# Patient Record
Sex: FEMALE | Race: WHITE
[De-identification: names, ages, dates, MRNs, and addresses within clinical notes are randomized per-mention and may not be internally consistent; named-entity substitution may affect disease eponyms.]

---

## 2020-08-21 ENCOUNTER — HOSPITAL ENCOUNTER (OUTPATIENT)
Age: 55
End: 2020-08-21
Payer: MEDICAID

## 2020-08-21 DIAGNOSIS — Z79.84: ICD-10-CM

## 2020-08-21 DIAGNOSIS — E11.9: Primary | ICD-10-CM

## 2020-08-21 LAB
ALBUMIN LEVEL: 3.2 G/DL (ref 3.5–5)
ALBUMIN/GLOB SERPL: 0.9 {RATIO} (ref 1.1–1.8)
ALP ISO SERPL-ACNC: 102 U/L (ref 38–126)
ALT SERPLBLD-CCNC: 21 U/L (ref 12–78)
ANION GAP SERPL CALC-SCNC: 15 MEQ/L (ref 5–15)
AST SERPL QL: 38 U/L (ref 14–36)
BILIRUBIN,TOTAL: 1 MG/DL (ref 0.2–1.3)
BUN SERPL-MCNC: 6 MG/DL (ref 7–17)
CALCIUM SPEC-MCNC: 9.4 MG/DL (ref 8.4–10.2)
CHLORIDE SPEC-SCNC: 105 MMOL/L (ref 98–107)
CHOLEST SPEC-SCNC: 133 MG/DL (ref 140–200)
CO2 SERPL-SCNC: 26 MMOL/L (ref 22–30)
CREAT BLD-SCNC: 0.7 MG/DL (ref 0.52–1.04)
ESTIMATED GLOMERULAR FILT RATE: 87 ML/MIN (ref 60–?)
GFR (AFRICAN AMERICAN): 105 ML/MIN (ref 60–?)
GLOBULIN SER CALC-MCNC: 3.5 G/DL (ref 1.3–3.2)
GLUCOSE: 102 MG/DL (ref 74–100)
HBA1C MFR BLD: 5.1 % (ref 4–6)
HCT VFR BLD CALC: 37.6 % (ref 37–47)
HDLC SERPL-MCNC: 27 MG/DL (ref 40–60)
HGB BLD-MCNC: 12.3 G/DL (ref 12.2–16.2)
MCHC RBC-ENTMCNC: 32.8 G/DL (ref 31.8–35.4)
MCV RBC: 96.9 FL (ref 81–99)
MEAN CORPUSCULAR HEMOGLOBIN: 31.8 PG (ref 27–31.2)
PLATELET # BLD: 70 K/MM3 (ref 142–424)
POTASSIUM: 4 MMOL/L (ref 3.5–5.1)
PROT SERPL-MCNC: 6.7 G/DL (ref 6.3–8.2)
RBC # BLD AUTO: 3.88 M/MM3 (ref 4.2–5.4)
SODIUM SPEC-SCNC: 142 MMOL/L (ref 136–145)
T4 (THYROXINE): 10.4 UG/DL (ref 5.53–11)
TRIGLYCERIDES: 97 MG/DL (ref 30–150)
TSH SERPL-ACNC: 5.5 UIU/ML (ref 0.47–4.68)
WBC # BLD AUTO: 2.5 K/MM3 (ref 4.8–10.8)

## 2020-08-21 PROCEDURE — 80053 COMPREHEN METABOLIC PANEL: CPT

## 2020-08-21 PROCEDURE — 84436 ASSAY OF TOTAL THYROXINE: CPT

## 2020-08-21 PROCEDURE — 83036 HEMOGLOBIN GLYCOSYLATED A1C: CPT

## 2020-08-21 PROCEDURE — 82043 UR ALBUMIN QUANTITATIVE: CPT

## 2020-08-21 PROCEDURE — 85025 COMPLETE CBC W/AUTO DIFF WBC: CPT

## 2020-08-21 PROCEDURE — 82570 ASSAY OF URINE CREATININE: CPT

## 2020-08-21 PROCEDURE — 80061 LIPID PANEL: CPT

## 2020-08-21 PROCEDURE — 84443 ASSAY THYROID STIM HORMONE: CPT

## 2020-10-19 ENCOUNTER — HOSPITAL ENCOUNTER (OUTPATIENT)
Age: 55
End: 2020-10-19
Payer: MEDICAID

## 2020-10-19 DIAGNOSIS — L08.9: ICD-10-CM

## 2020-10-19 DIAGNOSIS — E11.9: Primary | ICD-10-CM

## 2020-10-19 LAB
ALBUMIN LEVEL: 3.3 G/DL (ref 3.5–5)
ALBUMIN/GLOB SERPL: 0.8 {RATIO} (ref 1.1–1.8)
ALP ISO SERPL-ACNC: 97 U/L (ref 38–126)
ALT SERPLBLD-CCNC: 20 U/L (ref 12–78)
ANION GAP SERPL CALC-SCNC: 16.7 MEQ/L (ref 5–15)
AST SERPL QL: 38 U/L (ref 14–36)
BILIRUBIN,TOTAL: 1.3 MG/DL (ref 0.2–1.3)
BUN SERPL-MCNC: 8 MG/DL (ref 7–17)
CALCIUM SPEC-MCNC: 9.2 MG/DL (ref 8.4–10.2)
CHLORIDE SPEC-SCNC: 104 MMOL/L (ref 98–107)
CO2 SERPL-SCNC: 24 MMOL/L (ref 22–30)
CREAT BLD-SCNC: 0.8 MG/DL (ref 0.52–1.04)
ESTIMATED GLOMERULAR FILT RATE: 74 ML/MIN (ref 60–?)
GFR (AFRICAN AMERICAN): 90 ML/MIN (ref 60–?)
GLOBULIN SER CALC-MCNC: 4.1 G/DL (ref 1.3–3.2)
GLUCOSE: 69 MG/DL (ref 74–100)
HBA1C MFR BLD: 4.8 % (ref 4–6)
HCT VFR BLD CALC: 39.1 % (ref 37–47)
HGB BLD-MCNC: 12.2 G/DL (ref 12.2–16.2)
MCHC RBC-ENTMCNC: 31.2 G/DL (ref 31.8–35.4)
MCV RBC: 98.6 FL (ref 81–99)
MEAN CORPUSCULAR HEMOGLOBIN: 30.8 PG (ref 27–31.2)
PLATELET # BLD: 87 K/MM3 (ref 142–424)
POTASSIUM: 3.7 MMOL/L (ref 3.5–5.1)
PROT SERPL-MCNC: 7.4 G/DL (ref 6.3–8.2)
RBC # BLD AUTO: 3.96 M/MM3 (ref 4.2–5.4)
SODIUM SPEC-SCNC: 141 MMOL/L (ref 136–145)
WBC # BLD AUTO: 2.9 K/MM3 (ref 4.8–10.8)

## 2020-10-19 PROCEDURE — 80053 COMPREHEN METABOLIC PANEL: CPT

## 2020-10-19 PROCEDURE — 83036 HEMOGLOBIN GLYCOSYLATED A1C: CPT

## 2020-10-19 PROCEDURE — 85025 COMPLETE CBC W/AUTO DIFF WBC: CPT

## 2020-11-19 ENCOUNTER — HOSPITAL ENCOUNTER (OUTPATIENT)
Age: 55
End: 2020-11-19
Payer: MEDICAID

## 2020-11-19 DIAGNOSIS — D47.2: Primary | ICD-10-CM

## 2020-11-19 DIAGNOSIS — D69.6: ICD-10-CM

## 2020-11-19 DIAGNOSIS — M54.5: ICD-10-CM

## 2020-11-19 LAB
ALBUMIN LEVEL: 3.7 G/DL (ref 3.5–5)
ALBUMIN/GLOB SERPL: 0.8 {RATIO} (ref 1.1–1.8)
ALP ISO SERPL-ACNC: 111 U/L (ref 38–126)
ALT SERPLBLD-CCNC: 26 U/L (ref 12–78)
ANION GAP SERPL CALC-SCNC: 17.7 MEQ/L (ref 5–15)
AST SERPL QL: 44 U/L (ref 14–36)
BILIRUBIN,TOTAL: 1.2 MG/DL (ref 0.2–1.3)
BUN SERPL-MCNC: 14 MG/DL (ref 7–17)
CALCIUM SPEC-MCNC: 9.5 MG/DL (ref 8.4–10.2)
CHLORIDE SPEC-SCNC: 106 MMOL/L (ref 98–107)
CO2 SERPL-SCNC: 20 MMOL/L (ref 22–30)
CREAT BLD-SCNC: 1.1 MG/DL (ref 0.52–1.04)
ESTIMATED GLOMERULAR FILT RATE: 52 ML/MIN (ref 60–?)
FERRITIN SERPL-MCNC: 96.4 NG/ML (ref 11.1–264)
GFR (AFRICAN AMERICAN): 62 ML/MIN (ref 60–?)
GLOBULIN SER CALC-MCNC: 4.8 G/DL (ref 1.3–3.2)
GLUCOSE: 78 MG/DL (ref 74–100)
HCT VFR BLD CALC: 41.4 % (ref 37–47)
HGB BLD-MCNC: 13 G/DL (ref 12.2–16.2)
IRON SERPL QL: 55 UG/DL (ref 37–170)
MCHC RBC-ENTMCNC: 31.3 G/DL (ref 31.8–35.4)
MCV RBC: 98.5 FL (ref 81–99)
MEAN CORPUSCULAR HEMOGLOBIN: 30.8 PG (ref 27–31.2)
PLATELET # BLD: 117 K/MM3 (ref 142–424)
POTASSIUM: 4.7 MMOL/L (ref 3.5–5.1)
PROT SERPL-MCNC: 8.5 G/DL (ref 6.3–8.2)
RBC # BLD AUTO: 4.2 M/MM3 (ref 4.2–5.4)
SODIUM SPEC-SCNC: 139 MMOL/L (ref 136–145)
TOTAL IRON BINDING CAPACITY: 289 UG/DL (ref 265–497)
WBC # BLD AUTO: 4.7 K/MM3 (ref 4.8–10.8)

## 2020-11-19 PROCEDURE — 83540 ASSAY OF IRON: CPT

## 2020-11-19 PROCEDURE — 83883 ASSAY NEPHELOMETRY NOT SPEC: CPT

## 2020-11-19 PROCEDURE — 82728 ASSAY OF FERRITIN: CPT

## 2020-11-19 PROCEDURE — 72170 X-RAY EXAM OF PELVIS: CPT

## 2020-11-19 PROCEDURE — 84165 PROTEIN E-PHORESIS SERUM: CPT

## 2020-11-19 PROCEDURE — 86334 IMMUNOFIX E-PHORESIS SERUM: CPT

## 2020-11-19 PROCEDURE — 72110 X-RAY EXAM L-2 SPINE 4/>VWS: CPT

## 2020-11-19 PROCEDURE — 83550 IRON BINDING TEST: CPT

## 2020-11-19 PROCEDURE — 84155 ASSAY OF PROTEIN SERUM: CPT

## 2020-11-19 PROCEDURE — 36415 COLL VENOUS BLD VENIPUNCTURE: CPT

## 2020-11-19 PROCEDURE — 82784 ASSAY IGA/IGD/IGG/IGM EACH: CPT

## 2020-11-19 PROCEDURE — 80053 COMPREHEN METABOLIC PANEL: CPT

## 2020-11-19 PROCEDURE — 85025 COMPLETE CBC W/AUTO DIFF WBC: CPT

## 2020-11-23 LAB
ALBUMIN SERPL ELPH-MCNC: 3 G/DL (ref 2.9–4.4)
ALPHA1 GLOB SERPL ELPH-MCNC: 0.3 G/DL (ref 0–0.4)
ALPHA2 GLOB SERPL ELPH-MCNC: 0.8 G/DL (ref 0.4–1)
GAMMA GLOBULIN: 2.5 G/DL (ref 0.4–1.8)
IGA SERPL-MCNC: 710 MG/DL (ref 87–352)
IGG SERPL-MCNC: 2429 MG/DL (ref 586–1602)
IGM SERPL-MCNC: 142 MG/DL (ref 26–217)

## 2020-12-15 ENCOUNTER — HOSPITAL ENCOUNTER (OUTPATIENT)
Dept: HOSPITAL 22 - RAD | Age: 55
Discharge: HOME | End: 2020-12-15
Payer: MEDICAID

## 2020-12-15 VITALS
OXYGEN SATURATION: 95 % | RESPIRATION RATE: 18 BRPM | DIASTOLIC BLOOD PRESSURE: 66 MMHG | SYSTOLIC BLOOD PRESSURE: 124 MMHG | HEART RATE: 90 BPM

## 2020-12-15 VITALS — HEART RATE: 100 BPM | DIASTOLIC BLOOD PRESSURE: 76 MMHG | RESPIRATION RATE: 18 BRPM | SYSTOLIC BLOOD PRESSURE: 132 MMHG

## 2020-12-15 VITALS — BODY MASS INDEX: 36.7 KG/M2

## 2020-12-15 VITALS — RESPIRATION RATE: 18 BRPM | SYSTOLIC BLOOD PRESSURE: 127 MMHG | HEART RATE: 93 BPM | DIASTOLIC BLOOD PRESSURE: 68 MMHG

## 2020-12-15 DIAGNOSIS — R18.8: Primary | ICD-10-CM

## 2020-12-15 DIAGNOSIS — K74.60: ICD-10-CM

## 2020-12-15 LAB
CREAT BLD-SCNC: 1.2 MG/DL (ref 0.52–1.04)
CREATININE CLEARANCE ESTIMATED: 81 ML/MIN (ref 50–200)
ESTIMATED GLOMERULAR FILT RATE: 47 ML/MIN (ref 60–?)
GFR (AFRICAN AMERICAN): 56 ML/MIN (ref 60–?)

## 2020-12-15 PROCEDURE — P9047 ALBUMIN (HUMAN), 25%, 50ML: HCPCS

## 2020-12-15 PROCEDURE — 96365 THER/PROPH/DIAG IV INF INIT: CPT

## 2020-12-15 PROCEDURE — 82565 ASSAY OF CREATININE: CPT

## 2020-12-15 PROCEDURE — 76705 ECHO EXAM OF ABDOMEN: CPT

## 2020-12-15 PROCEDURE — 96366 THER/PROPH/DIAG IV INF ADDON: CPT

## 2020-12-15 PROCEDURE — 49083 ABD PARACENTESIS W/IMAGING: CPT

## 2020-12-30 ENCOUNTER — HOSPITAL ENCOUNTER (OUTPATIENT)
Age: 55
End: 2020-12-30
Payer: MEDICAID

## 2020-12-30 DIAGNOSIS — R18.8: ICD-10-CM

## 2020-12-30 DIAGNOSIS — R16.0: Primary | ICD-10-CM

## 2020-12-30 PROCEDURE — 74176 CT ABD & PELVIS W/O CONTRAST: CPT

## 2021-01-06 ENCOUNTER — HOSPITAL ENCOUNTER (OUTPATIENT)
Dept: HOSPITAL 22 - RAD | Age: 56
Discharge: HOME | End: 2021-01-06
Payer: MEDICAID

## 2021-01-06 VITALS
DIASTOLIC BLOOD PRESSURE: 72 MMHG | SYSTOLIC BLOOD PRESSURE: 143 MMHG | OXYGEN SATURATION: 98 % | RESPIRATION RATE: 18 BRPM | HEART RATE: 97 BPM

## 2021-01-06 VITALS
RESPIRATION RATE: 18 BRPM | OXYGEN SATURATION: 96 % | SYSTOLIC BLOOD PRESSURE: 137 MMHG | HEART RATE: 96 BPM | DIASTOLIC BLOOD PRESSURE: 72 MMHG

## 2021-01-06 VITALS
OXYGEN SATURATION: 95 % | DIASTOLIC BLOOD PRESSURE: 62 MMHG | RESPIRATION RATE: 18 BRPM | SYSTOLIC BLOOD PRESSURE: 122 MMHG | HEART RATE: 98 BPM

## 2021-01-06 VITALS
DIASTOLIC BLOOD PRESSURE: 58 MMHG | SYSTOLIC BLOOD PRESSURE: 105 MMHG | OXYGEN SATURATION: 96 % | HEART RATE: 78 BPM | RESPIRATION RATE: 18 BRPM

## 2021-01-06 VITALS
RESPIRATION RATE: 18 BRPM | SYSTOLIC BLOOD PRESSURE: 117 MMHG | OXYGEN SATURATION: 94 % | HEART RATE: 97 BPM | DIASTOLIC BLOOD PRESSURE: 59 MMHG

## 2021-01-06 VITALS
RESPIRATION RATE: 18 BRPM | SYSTOLIC BLOOD PRESSURE: 130 MMHG | DIASTOLIC BLOOD PRESSURE: 78 MMHG | OXYGEN SATURATION: 99 % | HEART RATE: 89 BPM

## 2021-01-06 VITALS — BODY MASS INDEX: 35.2 KG/M2

## 2021-01-06 VITALS — RESPIRATION RATE: 18 BRPM

## 2021-01-06 DIAGNOSIS — R18.8: Primary | ICD-10-CM

## 2021-01-06 LAB
CREAT BLD-SCNC: 1.5 MG/DL (ref 0.52–1.04)
CREATININE CLEARANCE ESTIMATED: 64 ML/MIN (ref 50–200)
ESTIMATED GLOMERULAR FILT RATE: 36 ML/MIN (ref 60–?)
GFR (AFRICAN AMERICAN): 43 ML/MIN (ref 60–?)

## 2021-01-06 PROCEDURE — P9047 ALBUMIN (HUMAN), 25%, 50ML: HCPCS

## 2021-01-06 PROCEDURE — 82565 ASSAY OF CREATININE: CPT

## 2021-01-06 PROCEDURE — 96375 TX/PRO/DX INJ NEW DRUG ADDON: CPT

## 2021-01-06 PROCEDURE — 96366 THER/PROPH/DIAG IV INF ADDON: CPT

## 2021-01-06 PROCEDURE — 49083 ABD PARACENTESIS W/IMAGING: CPT

## 2021-01-06 PROCEDURE — 96365 THER/PROPH/DIAG IV INF INIT: CPT

## 2021-01-14 ENCOUNTER — HOSPITAL ENCOUNTER (OUTPATIENT)
Age: 56
End: 2021-01-14
Payer: MEDICAID

## 2021-01-14 DIAGNOSIS — K74.60: Primary | ICD-10-CM

## 2021-01-14 DIAGNOSIS — Z79.899: ICD-10-CM

## 2021-01-14 LAB
ALBUMIN LEVEL: 2.9 G/DL (ref 3.5–5)
ALBUMIN/GLOB SERPL: 0.7 {RATIO} (ref 1.1–1.8)
ALP ISO SERPL-ACNC: 112 U/L (ref 38–126)
ALT SERPLBLD-CCNC: 23 U/L (ref 12–78)
ANION GAP SERPL CALC-SCNC: 11.6 MEQ/L (ref 5–15)
AST SERPL QL: 43 U/L (ref 14–36)
BILIRUBIN,TOTAL: 1.4 MG/DL (ref 0.2–1.3)
BUN SERPL-MCNC: 28 MG/DL (ref 7–17)
CALCIUM SPEC-MCNC: 9.1 MG/DL (ref 8.4–10.2)
CHLORIDE SPEC-SCNC: 109 MMOL/L (ref 98–107)
CHOLEST SPEC-SCNC: 115 MG/DL (ref 140–200)
CO2 SERPL-SCNC: 21 MMOL/L (ref 22–30)
CREAT BLD-SCNC: 1.6 MG/DL (ref 0.52–1.04)
ESTIMATED GLOMERULAR FILT RATE: 33 ML/MIN (ref 60–?)
GFR (AFRICAN AMERICAN): 40 ML/MIN (ref 60–?)
GLOBULIN SER CALC-MCNC: 4.4 G/DL (ref 1.3–3.2)
GLUCOSE: 84 MG/DL (ref 74–100)
HCT VFR BLD CALC: 38.7 % (ref 37–47)
HDLC SERPL-MCNC: 24 MG/DL (ref 40–60)
HGB BLD-MCNC: 12.1 G/DL (ref 12.2–16.2)
LIPASE: 107 U/L (ref 23–300)
MCHC RBC-ENTMCNC: 31.2 G/DL (ref 31.8–35.4)
MCV RBC: 99 FL (ref 81–99)
MEAN CORPUSCULAR HEMOGLOBIN: 30.9 PG (ref 27–31.2)
PLATELET # BLD: 125 K/MM3 (ref 142–424)
POTASSIUM: 5.6 MMOL/L (ref 3.5–5.1)
PROT SERPL-MCNC: 7.3 G/DL (ref 6.3–8.2)
RBC # BLD AUTO: 3.91 M/MM3 (ref 4.2–5.4)
SODIUM SPEC-SCNC: 136 MMOL/L (ref 136–145)
TRIGLYCERIDES: 105 MG/DL (ref 30–150)
TSH SERPL-ACNC: 4.21 UIU/ML (ref 0.47–4.68)
WBC # BLD AUTO: 5 K/MM3 (ref 4.8–10.8)

## 2021-01-14 PROCEDURE — 80061 LIPID PANEL: CPT

## 2021-01-14 PROCEDURE — 84443 ASSAY THYROID STIM HORMONE: CPT

## 2021-01-14 PROCEDURE — 80053 COMPREHEN METABOLIC PANEL: CPT

## 2021-01-14 PROCEDURE — 83690 ASSAY OF LIPASE: CPT

## 2021-01-14 PROCEDURE — 85025 COMPLETE CBC W/AUTO DIFF WBC: CPT

## 2021-01-29 ENCOUNTER — HOSPITAL ENCOUNTER (OUTPATIENT)
Age: 56
End: 2021-01-29
Payer: MEDICAID

## 2021-01-29 DIAGNOSIS — R18.8: Primary | ICD-10-CM

## 2021-01-29 LAB
ALBUMIN LEVEL: 2.7 G/DL (ref 3.5–5)
ALBUMIN/GLOB SERPL: 0.6 {RATIO} (ref 1.1–1.8)
ALP ISO SERPL-ACNC: 145 U/L (ref 38–126)
ALT SERPLBLD-CCNC: 24 U/L (ref 12–78)
ANION GAP SERPL CALC-SCNC: 12.5 MEQ/L (ref 5–15)
AST SERPL QL: 40 U/L (ref 14–36)
BILIRUBIN,TOTAL: 1.1 MG/DL (ref 0.2–1.3)
BUN SERPL-MCNC: 24 MG/DL (ref 7–17)
CALCIUM SPEC-MCNC: 9 MG/DL (ref 8.4–10.2)
CHLORIDE SPEC-SCNC: 112 MMOL/L (ref 98–107)
CO2 SERPL-SCNC: 21 MMOL/L (ref 22–30)
CREAT BLD-SCNC: 1.7 MG/DL (ref 0.52–1.04)
ESTIMATED GLOMERULAR FILT RATE: 31 ML/MIN (ref 60–?)
GFR (AFRICAN AMERICAN): 38 ML/MIN (ref 60–?)
GLOBULIN SER CALC-MCNC: 4.4 G/DL (ref 1.3–3.2)
GLUCOSE: 104 MG/DL (ref 74–100)
HCT VFR BLD CALC: 33.9 % (ref 37–47)
HGB BLD-MCNC: 10.8 G/DL (ref 12.2–16.2)
MCHC RBC-ENTMCNC: 31.9 G/DL (ref 31.8–35.4)
MCV RBC: 98.8 FL (ref 81–99)
MEAN CORPUSCULAR HEMOGLOBIN: 31.5 PG (ref 27–31.2)
PLATELET # BLD: 120 K/MM3 (ref 142–424)
POTASSIUM: 5.5 MMOL/L (ref 3.5–5.1)
PROT SERPL-MCNC: 7.1 G/DL (ref 6.3–8.2)
RBC # BLD AUTO: 3.43 M/MM3 (ref 4.2–5.4)
SODIUM SPEC-SCNC: 140 MMOL/L (ref 136–145)
WBC # BLD AUTO: 4.5 K/MM3 (ref 4.8–10.8)

## 2021-01-29 PROCEDURE — 80053 COMPREHEN METABOLIC PANEL: CPT

## 2021-01-29 PROCEDURE — 85025 COMPLETE CBC W/AUTO DIFF WBC: CPT

## 2021-02-05 ENCOUNTER — HOSPITAL ENCOUNTER (OUTPATIENT)
Dept: HOSPITAL 22 - ER | Age: 56
Setting detail: OBSERVATION
LOS: 1 days | Discharge: HOME | End: 2021-02-06
Payer: MEDICAID

## 2021-02-05 VITALS
SYSTOLIC BLOOD PRESSURE: 123 MMHG | DIASTOLIC BLOOD PRESSURE: 77 MMHG | HEART RATE: 109 BPM | RESPIRATION RATE: 18 BRPM | OXYGEN SATURATION: 98 % | TEMPERATURE: 98.4 F

## 2021-02-05 VITALS
SYSTOLIC BLOOD PRESSURE: 123 MMHG | HEART RATE: 102 BPM | DIASTOLIC BLOOD PRESSURE: 65 MMHG | OXYGEN SATURATION: 98 % | RESPIRATION RATE: 18 BRPM

## 2021-02-05 VITALS — BODY MASS INDEX: 34.3 KG/M2

## 2021-02-05 VITALS
OXYGEN SATURATION: 99 % | DIASTOLIC BLOOD PRESSURE: 60 MMHG | HEART RATE: 78 BPM | SYSTOLIC BLOOD PRESSURE: 101 MMHG | TEMPERATURE: 98.5 F | RESPIRATION RATE: 17 BRPM

## 2021-02-05 VITALS
HEART RATE: 96 BPM | DIASTOLIC BLOOD PRESSURE: 56 MMHG | OXYGEN SATURATION: 98 % | RESPIRATION RATE: 18 BRPM | SYSTOLIC BLOOD PRESSURE: 97 MMHG

## 2021-02-05 VITALS
SYSTOLIC BLOOD PRESSURE: 95 MMHG | DIASTOLIC BLOOD PRESSURE: 54 MMHG | OXYGEN SATURATION: 98 % | RESPIRATION RATE: 20 BRPM | HEART RATE: 95 BPM

## 2021-02-05 VITALS
RESPIRATION RATE: 18 BRPM | HEART RATE: 98 BPM | OXYGEN SATURATION: 95 % | SYSTOLIC BLOOD PRESSURE: 104 MMHG | DIASTOLIC BLOOD PRESSURE: 55 MMHG

## 2021-02-05 VITALS
TEMPERATURE: 98.42 F | HEART RATE: 95 BPM | OXYGEN SATURATION: 96 % | RESPIRATION RATE: 20 BRPM | DIASTOLIC BLOOD PRESSURE: 56 MMHG | SYSTOLIC BLOOD PRESSURE: 97 MMHG

## 2021-02-05 DIAGNOSIS — Z88.0: ICD-10-CM

## 2021-02-05 DIAGNOSIS — Z88.8: ICD-10-CM

## 2021-02-05 DIAGNOSIS — E11.9: ICD-10-CM

## 2021-02-05 DIAGNOSIS — K74.60: Primary | ICD-10-CM

## 2021-02-05 DIAGNOSIS — Z79.4: ICD-10-CM

## 2021-02-05 DIAGNOSIS — Z88.5: ICD-10-CM

## 2021-02-05 DIAGNOSIS — D47.2: ICD-10-CM

## 2021-02-05 DIAGNOSIS — R18.8: ICD-10-CM

## 2021-02-05 DIAGNOSIS — R29.6: ICD-10-CM

## 2021-02-05 DIAGNOSIS — I10: ICD-10-CM

## 2021-02-05 DIAGNOSIS — Z79.51: ICD-10-CM

## 2021-02-05 DIAGNOSIS — Z72.0: ICD-10-CM

## 2021-02-05 DIAGNOSIS — Z79.899: ICD-10-CM

## 2021-02-05 DIAGNOSIS — Z88.2: ICD-10-CM

## 2021-02-05 LAB
ALBUMIN LEVEL: 3 G/DL (ref 3.5–5)
ALBUMIN/GLOB SERPL: 0.6 {RATIO} (ref 1.1–1.8)
ALP ISO SERPL-ACNC: 178 U/L (ref 38–126)
ALT SERPLBLD-CCNC: 28 U/L (ref 12–78)
AMMONIA: 75 UMOL/L (ref 9–30)
ANION GAP SERPL CALC-SCNC: 12.5 MEQ/L (ref 5–15)
APTT PPP: 26.6 SECONDS (ref 23.6–34)
AST SERPL QL: 44 U/L (ref 14–36)
BILIRUBIN,TOTAL: 0.7 MG/DL (ref 0.2–1.3)
BUN SERPL-MCNC: 27 MG/DL (ref 7–17)
CALCIUM SPEC-MCNC: 9.3 MG/DL (ref 8.4–10.2)
CHLORIDE SPEC-SCNC: 115 MMOL/L (ref 98–107)
CO2 SERPL-SCNC: 18 MMOL/L (ref 22–30)
CREAT BLD-SCNC: 1.9 MG/DL (ref 0.52–1.04)
CREATININE CLEARANCE ESTIMATED: 47 ML/MIN (ref 50–200)
ESTIMATED GLOMERULAR FILT RATE: 27 ML/MIN (ref 60–?)
GFR (AFRICAN AMERICAN): 33 ML/MIN (ref 60–?)
GLOBULIN SER CALC-MCNC: 5 G/DL (ref 1.3–3.2)
GLUCOSE: 100 MG/DL (ref 74–100)
HCT VFR BLD CALC: 31.6 % (ref 37–47)
HGB BLD-MCNC: 9.8 G/DL (ref 12.2–16.2)
INR PPP: 1.26 (ref 0.9–1.1)
LACTATE SERPL-MCNC: 3.2 MMOL/L (ref 0.7–2.1)
LACTIC ACID FOLLOW UP (RFLX 2): 4.4 MMOL/L (ref 0.7–2.1)
LIPASE: 114 U/L (ref 23–300)
MCHC RBC-ENTMCNC: 31 G/DL (ref 31.8–35.4)
MCV RBC: 99.4 FL (ref 81–99)
MEAN CORPUSCULAR HEMOGLOBIN: 30.8 PG (ref 27–31.2)
PLATELET # BLD: 126 K/MM3 (ref 142–424)
POTASSIUM: 5.5 MMOL/L (ref 3.5–5.1)
PROT SERPL-MCNC: 8 G/DL (ref 6.3–8.2)
PT BLD: 12.8 SECONDS (ref 9.4–11.8)
RBC # BLD AUTO: 3.18 M/MM3 (ref 4.2–5.4)
REFLEX LACTIC (2 HRS): (no result)
REFLEX LACTIC: (no result)
SODIUM SPEC-SCNC: 140 MMOL/L (ref 136–145)
WBC # BLD AUTO: 4.5 K/MM3 (ref 4.8–10.8)

## 2021-02-05 PROCEDURE — 85610 PROTHROMBIN TIME: CPT

## 2021-02-05 PROCEDURE — 96365 THER/PROPH/DIAG IV INF INIT: CPT

## 2021-02-05 PROCEDURE — 83605 ASSAY OF LACTIC ACID: CPT

## 2021-02-05 PROCEDURE — U0003 INFECTIOUS AGENT DETECTION BY NUCLEIC ACID (DNA OR RNA); SEVERE ACUTE RESPIRATORY SYNDROME CORONAVIRUS 2 (SARS-COV-2) (CORONAVIRUS DISEASE [COVID-19]), AMPLIFIED PROBE TECHNIQUE, MAKING USE OF HIGH THROUGHPUT TECHNOLOGIES AS DESCRIBED BY CMS-2020-01-R: HCPCS

## 2021-02-05 PROCEDURE — 96366 THER/PROPH/DIAG IV INF ADDON: CPT

## 2021-02-05 PROCEDURE — 49083 ABD PARACENTESIS W/IMAGING: CPT

## 2021-02-05 PROCEDURE — 99285 EMERGENCY DEPT VISIT HI MDM: CPT

## 2021-02-05 PROCEDURE — G0378 HOSPITAL OBSERVATION PER HR: HCPCS

## 2021-02-05 PROCEDURE — 36415 COLL VENOUS BLD VENIPUNCTURE: CPT

## 2021-02-05 PROCEDURE — 82140 ASSAY OF AMMONIA: CPT

## 2021-02-05 PROCEDURE — 85730 THROMBOPLASTIN TIME PARTIAL: CPT

## 2021-02-05 PROCEDURE — P9047 ALBUMIN (HUMAN), 25%, 50ML: HCPCS

## 2021-02-05 PROCEDURE — 96375 TX/PRO/DX INJ NEW DRUG ADDON: CPT

## 2021-02-05 PROCEDURE — 85025 COMPLETE CBC W/AUTO DIFF WBC: CPT

## 2021-02-05 PROCEDURE — 80053 COMPREHEN METABOLIC PANEL: CPT

## 2021-02-05 PROCEDURE — 83690 ASSAY OF LIPASE: CPT

## 2021-02-05 PROCEDURE — 87040 BLOOD CULTURE FOR BACTERIA: CPT

## 2021-02-11 ENCOUNTER — HOSPITAL ENCOUNTER (OUTPATIENT)
Age: 56
End: 2021-02-11
Payer: MEDICAID

## 2021-02-11 DIAGNOSIS — E11.9: ICD-10-CM

## 2021-02-11 DIAGNOSIS — K74.60: ICD-10-CM

## 2021-02-11 DIAGNOSIS — Z79.84: ICD-10-CM

## 2021-02-11 DIAGNOSIS — D69.6: Primary | ICD-10-CM

## 2021-02-11 LAB
ALBUMIN LEVEL: 3.1 G/DL (ref 3.5–5)
ALBUMIN/GLOB SERPL: 0.7 {RATIO} (ref 1.1–1.8)
ALP ISO SERPL-ACNC: 129 U/L (ref 38–126)
ALT SERPLBLD-CCNC: 35 U/L (ref 12–78)
ANION GAP SERPL CALC-SCNC: 15.8 MEQ/L (ref 5–15)
AST SERPL QL: 47 U/L (ref 14–36)
BILIRUBIN,TOTAL: 1.1 MG/DL (ref 0.2–1.3)
BUN SERPL-MCNC: 36 MG/DL (ref 7–17)
CALCIUM SPEC-MCNC: 9.2 MG/DL (ref 8.4–10.2)
CHLORIDE SPEC-SCNC: 116 MMOL/L (ref 98–107)
CO2 SERPL-SCNC: 14 MMOL/L (ref 22–30)
CREAT BLD-SCNC: 2.6 MG/DL (ref 0.52–1.04)
ESTIMATED GLOMERULAR FILT RATE: 19 ML/MIN (ref 60–?)
GFR (AFRICAN AMERICAN): 23 ML/MIN (ref 60–?)
GLOBULIN SER CALC-MCNC: 4.5 G/DL (ref 1.3–3.2)
GLUCOSE: 89 MG/DL (ref 74–100)
HBA1C MFR BLD: 4.9 % (ref 4–6)
POTASSIUM: 5.8 MMOL/L (ref 3.5–5.1)
PROT SERPL-MCNC: 7.6 G/DL (ref 6.3–8.2)
SODIUM SPEC-SCNC: 140 MMOL/L (ref 136–145)

## 2021-02-11 PROCEDURE — 83036 HEMOGLOBIN GLYCOSYLATED A1C: CPT

## 2021-02-11 PROCEDURE — 82043 UR ALBUMIN QUANTITATIVE: CPT

## 2021-02-11 PROCEDURE — 80053 COMPREHEN METABOLIC PANEL: CPT

## 2021-03-03 ENCOUNTER — HOSPITAL ENCOUNTER (OUTPATIENT)
Age: 56
End: 2021-03-03
Payer: MEDICAID

## 2021-03-03 VITALS — SYSTOLIC BLOOD PRESSURE: 113 MMHG | RESPIRATION RATE: 18 BRPM | HEART RATE: 105 BPM | DIASTOLIC BLOOD PRESSURE: 61 MMHG

## 2021-03-03 VITALS — DIASTOLIC BLOOD PRESSURE: 78 MMHG | SYSTOLIC BLOOD PRESSURE: 136 MMHG | RESPIRATION RATE: 18 BRPM | HEART RATE: 111 BPM

## 2021-03-03 VITALS
SYSTOLIC BLOOD PRESSURE: 112 MMHG | RESPIRATION RATE: 16 BRPM | TEMPERATURE: 98.96 F | OXYGEN SATURATION: 93 % | DIASTOLIC BLOOD PRESSURE: 53 MMHG

## 2021-03-03 VITALS
SYSTOLIC BLOOD PRESSURE: 121 MMHG | OXYGEN SATURATION: 98 % | DIASTOLIC BLOOD PRESSURE: 68 MMHG | TEMPERATURE: 97.4 F | HEART RATE: 109 BPM | RESPIRATION RATE: 20 BRPM

## 2021-03-03 VITALS — SYSTOLIC BLOOD PRESSURE: 108 MMHG | DIASTOLIC BLOOD PRESSURE: 65 MMHG | RESPIRATION RATE: 20 BRPM | HEART RATE: 112 BPM

## 2021-03-03 VITALS — BODY MASS INDEX: 36.7 KG/M2

## 2021-03-03 DIAGNOSIS — K74.60: Primary | ICD-10-CM

## 2021-03-03 DIAGNOSIS — R18.8: ICD-10-CM

## 2021-03-03 LAB
CREAT BLD-SCNC: 1.7 MG/DL (ref 0.52–1.04)
CREATININE CLEARANCE ESTIMATED: 57 ML/MIN (ref 50–200)
ESTIMATED GLOMERULAR FILT RATE: 31 ML/MIN (ref 60–?)
GFR (AFRICAN AMERICAN): 38 ML/MIN (ref 60–?)

## 2021-03-03 PROCEDURE — 82565 ASSAY OF CREATININE: CPT

## 2021-03-03 PROCEDURE — 49083 ABD PARACENTESIS W/IMAGING: CPT

## 2021-03-03 PROCEDURE — 96366 THER/PROPH/DIAG IV INF ADDON: CPT

## 2021-03-03 PROCEDURE — 96365 THER/PROPH/DIAG IV INF INIT: CPT

## 2021-03-17 ENCOUNTER — HOSPITAL ENCOUNTER (OUTPATIENT)
Dept: HOSPITAL 22 - RAD | Age: 56
Discharge: HOME | End: 2021-03-17
Payer: MEDICAID

## 2021-03-17 VITALS
RESPIRATION RATE: 18 BRPM | SYSTOLIC BLOOD PRESSURE: 115 MMHG | HEART RATE: 88 BPM | DIASTOLIC BLOOD PRESSURE: 64 MMHG | OXYGEN SATURATION: 98 %

## 2021-03-17 VITALS — DIASTOLIC BLOOD PRESSURE: 57 MMHG | SYSTOLIC BLOOD PRESSURE: 107 MMHG | RESPIRATION RATE: 18 BRPM | HEART RATE: 105 BPM

## 2021-03-17 VITALS — BODY MASS INDEX: 36.7 KG/M2

## 2021-03-17 DIAGNOSIS — R14.0: ICD-10-CM

## 2021-03-17 DIAGNOSIS — R18.8: Primary | ICD-10-CM

## 2021-03-17 DIAGNOSIS — K74.60: ICD-10-CM

## 2021-03-17 PROCEDURE — 96365 THER/PROPH/DIAG IV INF INIT: CPT

## 2021-03-17 PROCEDURE — P9047 ALBUMIN (HUMAN), 25%, 50ML: HCPCS

## 2021-03-17 PROCEDURE — 96366 THER/PROPH/DIAG IV INF ADDON: CPT

## 2021-03-17 PROCEDURE — 49083 ABD PARACENTESIS W/IMAGING: CPT

## 2021-03-17 PROCEDURE — 82565 ASSAY OF CREATININE: CPT

## 2021-03-26 ENCOUNTER — HOSPITAL ENCOUNTER (OUTPATIENT)
Dept: HOSPITAL 22 - RAD | Age: 56
Discharge: HOME | End: 2021-03-26
Payer: MEDICAID

## 2021-03-26 VITALS
DIASTOLIC BLOOD PRESSURE: 44 MMHG | OXYGEN SATURATION: 100 % | SYSTOLIC BLOOD PRESSURE: 91 MMHG | HEART RATE: 80 BPM | RESPIRATION RATE: 18 BRPM

## 2021-03-26 VITALS — HEART RATE: 75 BPM | DIASTOLIC BLOOD PRESSURE: 64 MMHG | SYSTOLIC BLOOD PRESSURE: 94 MMHG | RESPIRATION RATE: 18 BRPM

## 2021-03-26 VITALS — BODY MASS INDEX: 27.4 KG/M2

## 2021-03-26 DIAGNOSIS — R18.8: Primary | ICD-10-CM

## 2021-03-26 LAB
ALBUMIN LEVEL: 3.2 G/DL (ref 3.5–5)
ALBUMIN/GLOB SERPL: 0.7 {RATIO} (ref 1.1–1.8)
ALP ISO SERPL-ACNC: 184 U/L (ref 38–126)
ALT SERPLBLD-CCNC: 35 U/L (ref 12–78)
ANION GAP SERPL CALC-SCNC: 15.1 MEQ/L (ref 5–15)
AST SERPL QL: 50 U/L (ref 14–36)
BILIRUBIN,TOTAL: 2.4 MG/DL (ref 0.2–1.3)
BUN SERPL-MCNC: 24 MG/DL (ref 7–17)
CALCIUM SPEC-MCNC: 8.8 MG/DL (ref 8.4–10.2)
CHLORIDE SPEC-SCNC: 108 MMOL/L (ref 98–107)
CO2 SERPL-SCNC: 16 MMOL/L (ref 22–30)
CREAT BLD-SCNC: 1.8 MG/DL (ref 0.52–1.04)
CREATININE CLEARANCE ESTIMATED: 40 ML/MIN (ref 50–200)
ESTIMATED GLOMERULAR FILT RATE: 29 ML/MIN (ref 60–?)
GFR (AFRICAN AMERICAN): 35 ML/MIN (ref 60–?)
GLOBULIN SER CALC-MCNC: 4.5 G/DL (ref 1.3–3.2)
GLUCOSE: 99 MG/DL (ref 74–100)
HCT VFR BLD CALC: 31.8 % (ref 37–47)
HGB BLD-MCNC: 10.1 G/DL (ref 12.2–16.2)
MCHC RBC-ENTMCNC: 31.9 G/DL (ref 31.8–35.4)
MCV RBC: 96.4 FL (ref 81–99)
MEAN CORPUSCULAR HEMOGLOBIN: 30.8 PG (ref 27–31.2)
PLATELET # BLD: 106 K/MM3 (ref 142–424)
POTASSIUM: 4.1 MMOL/L (ref 3.5–5.1)
PROT SERPL-MCNC: 7.7 G/DL (ref 6.3–8.2)
RBC # BLD AUTO: 3.3 M/MM3 (ref 4.2–5.4)
SODIUM SPEC-SCNC: 135 MMOL/L (ref 136–145)
WBC # BLD AUTO: 6.5 K/MM3 (ref 4.8–10.8)

## 2021-03-26 PROCEDURE — 84155 ASSAY OF PROTEIN SERUM: CPT

## 2021-03-26 PROCEDURE — 85025 COMPLETE CBC W/AUTO DIFF WBC: CPT

## 2021-03-26 PROCEDURE — 82784 ASSAY IGA/IGD/IGG/IGM EACH: CPT

## 2021-03-26 PROCEDURE — 83883 ASSAY NEPHELOMETRY NOT SPEC: CPT

## 2021-03-26 PROCEDURE — 49083 ABD PARACENTESIS W/IMAGING: CPT

## 2021-03-26 PROCEDURE — 96365 THER/PROPH/DIAG IV INF INIT: CPT

## 2021-03-26 PROCEDURE — P9047 ALBUMIN (HUMAN), 25%, 50ML: HCPCS

## 2021-03-26 PROCEDURE — 86334 IMMUNOFIX E-PHORESIS SERUM: CPT

## 2021-03-26 PROCEDURE — 84165 PROTEIN E-PHORESIS SERUM: CPT

## 2021-03-26 PROCEDURE — 96366 THER/PROPH/DIAG IV INF ADDON: CPT

## 2021-03-26 PROCEDURE — 80053 COMPREHEN METABOLIC PANEL: CPT

## 2021-03-27 ENCOUNTER — HOSPITAL ENCOUNTER (OUTPATIENT)
Age: 56
End: 2021-03-27
Payer: MEDICAID

## 2021-03-27 DIAGNOSIS — R10.9: Primary | ICD-10-CM

## 2021-03-27 DIAGNOSIS — R18.8: ICD-10-CM

## 2021-03-27 LAB
ANION GAP SERPL CALC-SCNC: 15.2 MEQ/L (ref 5–15)
BUN SERPL-MCNC: 22 MG/DL (ref 7–17)
CALCIUM SPEC-MCNC: 8.4 MG/DL (ref 8.4–10.2)
CHLORIDE SPEC-SCNC: 110 MMOL/L (ref 98–107)
CO2 SERPL-SCNC: 16 MMOL/L (ref 22–30)
CREAT BLD-SCNC: 1.6 MG/DL (ref 0.52–1.04)
ESTIMATED GLOMERULAR FILT RATE: 33 ML/MIN (ref 60–?)
GFR (AFRICAN AMERICAN): 40 ML/MIN (ref 60–?)
GLUCOSE: 95 MG/DL (ref 74–100)
HCT VFR BLD CALC: 26.4 % (ref 37–47)
HGB BLD-MCNC: 8.5 G/DL (ref 12.2–16.2)
INR PPP: 1.52 (ref 0.9–1.1)
MCHC RBC-ENTMCNC: 32.1 G/DL (ref 31.8–35.4)
MCV RBC: 96.9 FL (ref 81–99)
MEAN CORPUSCULAR HEMOGLOBIN: 31.1 PG (ref 27–31.2)
PLATELET # BLD: 83 K/MM3 (ref 142–424)
POTASSIUM: 4.2 MMOL/L (ref 3.5–5.1)
PT BLD: 17.4 SECONDS (ref 10.1–12.5)
RBC # BLD AUTO: 2.73 M/MM3 (ref 4.2–5.4)
SODIUM SPEC-SCNC: 137 MMOL/L (ref 136–145)
WBC # BLD AUTO: 5.9 K/MM3 (ref 4.8–10.8)

## 2021-03-27 PROCEDURE — 80048 BASIC METABOLIC PNL TOTAL CA: CPT

## 2021-03-27 PROCEDURE — 86328 IA NFCT AB SARSCOV2 COVID19: CPT

## 2021-03-27 PROCEDURE — 85025 COMPLETE CBC W/AUTO DIFF WBC: CPT

## 2021-03-27 PROCEDURE — 36415 COLL VENOUS BLD VENIPUNCTURE: CPT

## 2021-03-27 PROCEDURE — 85610 PROTHROMBIN TIME: CPT

## 2021-03-29 LAB
ALBUMIN SERPL ELPH-MCNC: 3.2 G/DL (ref 2.9–4.4)
ALPHA1 GLOB SERPL ELPH-MCNC: 0.2 G/DL (ref 0–0.4)
ALPHA2 GLOB SERPL ELPH-MCNC: 0.5 G/DL (ref 0.4–1)
GAMMA GLOBULIN: 2.8 G/DL (ref 0.4–1.8)
IGG SERPL-MCNC: 2757 MG/DL (ref 586–1602)

## 2021-03-30 ENCOUNTER — HOSPITAL ENCOUNTER (OUTPATIENT)
Dept: HOSPITAL 22 - OR | Age: 56
Discharge: HOME | End: 2021-03-30
Payer: MEDICAID

## 2021-03-30 VITALS
HEART RATE: 96 BPM | OXYGEN SATURATION: 97 % | SYSTOLIC BLOOD PRESSURE: 112 MMHG | DIASTOLIC BLOOD PRESSURE: 74 MMHG | TEMPERATURE: 97.16 F | RESPIRATION RATE: 12 BRPM

## 2021-03-30 VITALS — BODY MASS INDEX: 26.2 KG/M2

## 2021-03-30 VITALS
DIASTOLIC BLOOD PRESSURE: 65 MMHG | OXYGEN SATURATION: 97 % | SYSTOLIC BLOOD PRESSURE: 109 MMHG | TEMPERATURE: 97.16 F | RESPIRATION RATE: 16 BRPM | HEART RATE: 94 BPM

## 2021-03-30 VITALS
RESPIRATION RATE: 16 BRPM | SYSTOLIC BLOOD PRESSURE: 90 MMHG | OXYGEN SATURATION: 97 % | HEART RATE: 91 BPM | TEMPERATURE: 97.1 F | DIASTOLIC BLOOD PRESSURE: 63 MMHG

## 2021-03-30 VITALS
RESPIRATION RATE: 18 BRPM | TEMPERATURE: 98.96 F | DIASTOLIC BLOOD PRESSURE: 66 MMHG | SYSTOLIC BLOOD PRESSURE: 114 MMHG | OXYGEN SATURATION: 100 % | HEART RATE: 104 BPM

## 2021-03-30 VITALS
TEMPERATURE: 97.52 F | DIASTOLIC BLOOD PRESSURE: 56 MMHG | OXYGEN SATURATION: 98 % | HEART RATE: 91 BPM | RESPIRATION RATE: 18 BRPM | SYSTOLIC BLOOD PRESSURE: 95 MMHG

## 2021-03-30 VITALS
RESPIRATION RATE: 18 BRPM | TEMPERATURE: 97.52 F | SYSTOLIC BLOOD PRESSURE: 112 MMHG | HEART RATE: 92 BPM | DIASTOLIC BLOOD PRESSURE: 50 MMHG | OXYGEN SATURATION: 100 %

## 2021-03-30 VITALS
RESPIRATION RATE: 18 BRPM | TEMPERATURE: 97.52 F | DIASTOLIC BLOOD PRESSURE: 54 MMHG | HEART RATE: 96 BPM | OXYGEN SATURATION: 99 % | SYSTOLIC BLOOD PRESSURE: 103 MMHG

## 2021-03-30 VITALS
RESPIRATION RATE: 18 BRPM | TEMPERATURE: 97.52 F | OXYGEN SATURATION: 97 % | HEART RATE: 91 BPM | DIASTOLIC BLOOD PRESSURE: 66 MMHG | SYSTOLIC BLOOD PRESSURE: 110 MMHG

## 2021-03-30 VITALS
OXYGEN SATURATION: 97 % | DIASTOLIC BLOOD PRESSURE: 67 MMHG | RESPIRATION RATE: 16 BRPM | SYSTOLIC BLOOD PRESSURE: 104 MMHG | HEART RATE: 94 BPM | TEMPERATURE: 97.1 F

## 2021-03-30 VITALS
RESPIRATION RATE: 16 BRPM | DIASTOLIC BLOOD PRESSURE: 57 MMHG | SYSTOLIC BLOOD PRESSURE: 99 MMHG | TEMPERATURE: 97.5 F | HEART RATE: 90 BPM | OXYGEN SATURATION: 97 %

## 2021-03-30 DIAGNOSIS — Z85.038: ICD-10-CM

## 2021-03-30 DIAGNOSIS — I50.9: ICD-10-CM

## 2021-03-30 DIAGNOSIS — F41.9: ICD-10-CM

## 2021-03-30 DIAGNOSIS — K74.60: Primary | ICD-10-CM

## 2021-03-30 DIAGNOSIS — Z85.828: ICD-10-CM

## 2021-03-30 DIAGNOSIS — E11.9: ICD-10-CM

## 2021-03-30 DIAGNOSIS — I11.0: ICD-10-CM

## 2021-03-30 DIAGNOSIS — Z85.3: ICD-10-CM

## 2021-03-30 DIAGNOSIS — F32.9: ICD-10-CM

## 2021-03-30 DIAGNOSIS — K21.9: ICD-10-CM

## 2021-03-30 DIAGNOSIS — E07.9: ICD-10-CM

## 2021-03-30 DIAGNOSIS — E78.5: ICD-10-CM

## 2021-03-30 LAB
GLUCOSE BLDC GLUCOMTR-MCNC: 67 MG/DL (ref 70–110)
GLUCOSE BLDC GLUCOMTR-MCNC: 76 MG/DL (ref 70–110)
IGA SERPL-MCNC: 1051 MG/DL (ref 87–352)
IGM SERPL-MCNC: 165 MG/DL (ref 26–217)

## 2021-03-30 PROCEDURE — 71045 X-RAY EXAM CHEST 1 VIEW: CPT

## 2021-03-30 PROCEDURE — 82962 GLUCOSE BLOOD TEST: CPT

## 2021-03-30 PROCEDURE — 96374 THER/PROPH/DIAG INJ IV PUSH: CPT

## 2021-03-30 PROCEDURE — 76000 FLUOROSCOPY <1 HR PHYS/QHP: CPT

## 2021-03-30 PROCEDURE — C1788 PORT, INDWELLING, IMP: HCPCS

## 2021-03-30 PROCEDURE — 36561 INSERT TUNNELED CV CATH: CPT

## 2021-03-31 VITALS — DIASTOLIC BLOOD PRESSURE: 66 MMHG | TEMPERATURE: 97.6 F | HEART RATE: 91 BPM | SYSTOLIC BLOOD PRESSURE: 110 MMHG

## 2021-04-07 ENCOUNTER — HOSPITAL ENCOUNTER (OUTPATIENT)
Age: 56
Discharge: HOME | End: 2021-04-07
Payer: MEDICAID

## 2021-04-07 VITALS — HEART RATE: 99 BPM | DIASTOLIC BLOOD PRESSURE: 47 MMHG | RESPIRATION RATE: 20 BRPM | SYSTOLIC BLOOD PRESSURE: 92 MMHG

## 2021-04-07 VITALS
OXYGEN SATURATION: 97 % | TEMPERATURE: 97.16 F | DIASTOLIC BLOOD PRESSURE: 68 MMHG | RESPIRATION RATE: 18 BRPM | HEART RATE: 99 BPM | SYSTOLIC BLOOD PRESSURE: 105 MMHG

## 2021-04-07 VITALS — BODY MASS INDEX: 27.6 KG/M2

## 2021-04-07 DIAGNOSIS — R18.8: Primary | ICD-10-CM

## 2021-04-07 LAB
CREAT BLD-SCNC: 2 MG/DL (ref 0.52–1.04)
CREATININE CLEARANCE ESTIMATED: 37 ML/MIN (ref 50–200)
ESTIMATED GLOMERULAR FILT RATE: 26 ML/MIN (ref 60–?)
GFR (AFRICAN AMERICAN): 31 ML/MIN (ref 60–?)

## 2021-04-07 PROCEDURE — P9047 ALBUMIN (HUMAN), 25%, 50ML: HCPCS

## 2021-04-07 PROCEDURE — 96365 THER/PROPH/DIAG IV INF INIT: CPT

## 2021-04-07 PROCEDURE — 82565 ASSAY OF CREATININE: CPT

## 2021-04-07 PROCEDURE — 49083 ABD PARACENTESIS W/IMAGING: CPT

## 2021-04-07 PROCEDURE — 96366 THER/PROPH/DIAG IV INF ADDON: CPT

## 2021-04-21 ENCOUNTER — HOSPITAL ENCOUNTER (OUTPATIENT)
Dept: HOSPITAL 22 - RAD | Age: 56
Discharge: STILL ADMITTED OR ADMINISTRATIVE | End: 2021-04-21
Payer: MEDICAID

## 2021-04-21 ENCOUNTER — HOSPITAL ENCOUNTER (INPATIENT)
Dept: HOSPITAL 22 - ER | Age: 56
LOS: 3 days | Discharge: HOME | DRG: 682 | End: 2021-04-24
Payer: MEDICAID

## 2021-04-21 VITALS
HEART RATE: 92 BPM | TEMPERATURE: 97.88 F | RESPIRATION RATE: 16 BRPM | OXYGEN SATURATION: 100 % | DIASTOLIC BLOOD PRESSURE: 54 MMHG | SYSTOLIC BLOOD PRESSURE: 98 MMHG

## 2021-04-21 VITALS — SYSTOLIC BLOOD PRESSURE: 96 MMHG | DIASTOLIC BLOOD PRESSURE: 49 MMHG | HEART RATE: 79 BPM | OXYGEN SATURATION: 100 %

## 2021-04-21 VITALS — DIASTOLIC BLOOD PRESSURE: 69 MMHG | SYSTOLIC BLOOD PRESSURE: 123 MMHG | HEART RATE: 85 BPM | OXYGEN SATURATION: 100 %

## 2021-04-21 VITALS
TEMPERATURE: 99.3 F | DIASTOLIC BLOOD PRESSURE: 58 MMHG | SYSTOLIC BLOOD PRESSURE: 90 MMHG | HEART RATE: 87 BPM | RESPIRATION RATE: 18 BRPM | OXYGEN SATURATION: 99 %

## 2021-04-21 VITALS
DIASTOLIC BLOOD PRESSURE: 53 MMHG | TEMPERATURE: 97.88 F | HEART RATE: 78 BPM | RESPIRATION RATE: 16 BRPM | SYSTOLIC BLOOD PRESSURE: 97 MMHG | OXYGEN SATURATION: 98 %

## 2021-04-21 VITALS — HEART RATE: 82 BPM | OXYGEN SATURATION: 100 % | SYSTOLIC BLOOD PRESSURE: 107 MMHG | DIASTOLIC BLOOD PRESSURE: 63 MMHG

## 2021-04-21 VITALS — DIASTOLIC BLOOD PRESSURE: 50 MMHG | SYSTOLIC BLOOD PRESSURE: 106 MMHG

## 2021-04-21 VITALS — HEART RATE: 80 BPM | OXYGEN SATURATION: 100 % | DIASTOLIC BLOOD PRESSURE: 67 MMHG | SYSTOLIC BLOOD PRESSURE: 107 MMHG

## 2021-04-21 VITALS
RESPIRATION RATE: 16 BRPM | TEMPERATURE: 97.88 F | SYSTOLIC BLOOD PRESSURE: 91 MMHG | HEART RATE: 72 BPM | DIASTOLIC BLOOD PRESSURE: 42 MMHG | OXYGEN SATURATION: 95 %

## 2021-04-21 VITALS
BODY MASS INDEX: 23.6 KG/M2 | BODY MASS INDEX: 23.3 KG/M2 | OXYGEN SATURATION: 100 % | DIASTOLIC BLOOD PRESSURE: 46 MMHG | TEMPERATURE: 98.1 F | BODY MASS INDEX: 22.8 KG/M2 | HEART RATE: 89 BPM | BODY MASS INDEX: 23.2 KG/M2 | BODY MASS INDEX: 23.1 KG/M2 | RESPIRATION RATE: 22 BRPM | SYSTOLIC BLOOD PRESSURE: 124 MMHG

## 2021-04-21 VITALS — BODY MASS INDEX: 36.7 KG/M2

## 2021-04-21 VITALS — OXYGEN SATURATION: 100 % | DIASTOLIC BLOOD PRESSURE: 55 MMHG | HEART RATE: 75 BPM | SYSTOLIC BLOOD PRESSURE: 97 MMHG

## 2021-04-21 VITALS — OXYGEN SATURATION: 100 % | DIASTOLIC BLOOD PRESSURE: 45 MMHG | HEART RATE: 79 BPM | SYSTOLIC BLOOD PRESSURE: 103 MMHG

## 2021-04-21 VITALS — HEART RATE: 80 BPM | SYSTOLIC BLOOD PRESSURE: 95 MMHG | DIASTOLIC BLOOD PRESSURE: 53 MMHG | OXYGEN SATURATION: 100 %

## 2021-04-21 VITALS — DIASTOLIC BLOOD PRESSURE: 48 MMHG | SYSTOLIC BLOOD PRESSURE: 88 MMHG

## 2021-04-21 VITALS — HEART RATE: 78 BPM | DIASTOLIC BLOOD PRESSURE: 45 MMHG | OXYGEN SATURATION: 100 % | SYSTOLIC BLOOD PRESSURE: 91 MMHG

## 2021-04-21 DIAGNOSIS — D69.6: ICD-10-CM

## 2021-04-21 DIAGNOSIS — K21.9: ICD-10-CM

## 2021-04-21 DIAGNOSIS — Z88.2: ICD-10-CM

## 2021-04-21 DIAGNOSIS — I13.0: ICD-10-CM

## 2021-04-21 DIAGNOSIS — M19.90: ICD-10-CM

## 2021-04-21 DIAGNOSIS — I42.9: ICD-10-CM

## 2021-04-21 DIAGNOSIS — R18.8: Primary | ICD-10-CM

## 2021-04-21 DIAGNOSIS — K55.069: ICD-10-CM

## 2021-04-21 DIAGNOSIS — Z88.8: ICD-10-CM

## 2021-04-21 DIAGNOSIS — N17.9: Primary | ICD-10-CM

## 2021-04-21 DIAGNOSIS — E11.22: ICD-10-CM

## 2021-04-21 DIAGNOSIS — Z85.3: ICD-10-CM

## 2021-04-21 DIAGNOSIS — Z85.828: ICD-10-CM

## 2021-04-21 DIAGNOSIS — I50.9: ICD-10-CM

## 2021-04-21 DIAGNOSIS — J18.9: ICD-10-CM

## 2021-04-21 DIAGNOSIS — K74.60: ICD-10-CM

## 2021-04-21 DIAGNOSIS — F41.9: ICD-10-CM

## 2021-04-21 DIAGNOSIS — D63.1: ICD-10-CM

## 2021-04-21 DIAGNOSIS — E78.5: ICD-10-CM

## 2021-04-21 DIAGNOSIS — K72.90: ICD-10-CM

## 2021-04-21 DIAGNOSIS — F17.210: ICD-10-CM

## 2021-04-21 DIAGNOSIS — N18.9: ICD-10-CM

## 2021-04-21 LAB
ALBUMIN LEVEL: 2.7 G/DL (ref 3.5–5)
ALP ISO SERPL-ACNC: 142 U/L (ref 38–126)
ALT SERPLBLD-CCNC: 26 U/L (ref 12–78)
AMMONIA: 79 UMOL/L (ref 9–30)
ANION GAP SERPL CALC-SCNC: 12 MEQ/L (ref 5–15)
ANION GAP SERPL CALC-SCNC: 13.1 MEQ/L (ref 5–15)
APTT PPP: 39.7 SECONDS (ref 22.8–30.6)
AST SERPL QL: 37 U/L (ref 14–36)
BACTERIA URNS QL MICRO: (no result) /LPF
BILIRUB DIRECT SERPL-MCNC: 0.8 MG/DL (ref 0–0.4)
BILIRUB INDIRECT SERPL-MCNC: 1.2 MG/DL (ref 0–0.9)
BILIRUB INDIRECT SERPL-MCNC: 1.2 MG/DL (ref 0–1.1)
BILIRUBIN,TOTAL: 2 MG/DL (ref 0.2–1.3)
BUN SERPL-MCNC: 48 MG/DL (ref 7–17)
BUN SERPL-MCNC: 50 MG/DL (ref 7–17)
CALCIUM SPEC-MCNC: 8.9 MG/DL (ref 8.4–10.2)
CALCIUM SPEC-MCNC: 8.9 MG/DL (ref 8.4–10.2)
CHLORIDE SPEC-SCNC: 108 MMOL/L (ref 98–107)
CHLORIDE SPEC-SCNC: 109 MMOL/L (ref 98–107)
CO2 SERPL-SCNC: 17 MMOL/L (ref 22–30)
CO2 SERPL-SCNC: 19 MMOL/L (ref 22–30)
COLOR UR: YELLOW
CREAT BLD-SCNC: 2.8 MG/DL (ref 0.52–1.04)
CREAT BLD-SCNC: 3 MG/DL (ref 0.52–1.04)
CREAT BLD-SCNC: 3.1 MG/DL (ref 0.52–1.04)
CREATININE CLEARANCE ESTIMATED: 20 ML/MIN (ref 50–200)
CREATININE CLEARANCE ESTIMATED: 21 ML/MIN (ref 50–200)
CREATININE CLEARANCE ESTIMATED: 31 ML/MIN (ref 50–200)
ESTIMATED GLOMERULAR FILT RATE: 16 ML/MIN (ref 60–?)
ESTIMATED GLOMERULAR FILT RATE: 16 ML/MIN (ref 60–?)
ESTIMATED GLOMERULAR FILT RATE: 17 ML/MIN (ref 60–?)
GFR (AFRICAN AMERICAN): 19 ML/MIN (ref 60–?)
GFR (AFRICAN AMERICAN): 20 ML/MIN (ref 60–?)
GFR (AFRICAN AMERICAN): 21 ML/MIN (ref 60–?)
GLUCOSE: 89 MG/DL (ref 74–100)
GLUCOSE: 99 MG/DL (ref 74–100)
HCT VFR BLD CALC: 28.6 % (ref 37–47)
HCT VFR BLD CALC: 29.3 % (ref 37–47)
HGB BLD-MCNC: 9.2 G/DL (ref 12.2–16.2)
HGB BLD-MCNC: 9.4 G/DL (ref 12.2–16.2)
HYALINE CASTS URNS QL MICRO: (no result) #/LPF
INR PPP: 1.65 (ref 0.9–1.1)
MCHC RBC-ENTMCNC: 32.1 G/DL (ref 31.8–35.4)
MCHC RBC-ENTMCNC: 32.2 G/DL (ref 31.8–35.4)
MCV RBC: 97.1 FL (ref 81–99)
MCV RBC: 97.4 FL (ref 81–99)
MEAN CORPUSCULAR HEMOGLOBIN: 31.2 PG (ref 27–31.2)
MEAN CORPUSCULAR HEMOGLOBIN: 31.2 PG (ref 27–31.2)
MICRO URNS: (no result)
PH UR: 6 [PH] (ref 5–8.5)
PLATELET # BLD: 65 K/MM3 (ref 142–424)
PLATELET # BLD: 75 K/MM3 (ref 142–424)
POTASSIUM: 4 MMOL/L (ref 3.5–5.1)
POTASSIUM: 4.1 MMOL/L (ref 3.5–5.1)
PROT SERPL-MCNC: 6.7 G/DL (ref 6.3–8.2)
PT BLD: 18.8 SECONDS (ref 10.1–12.5)
RBC # BLD AUTO: 2.94 M/MM3 (ref 4.2–5.4)
RBC # BLD AUTO: 3.02 M/MM3 (ref 4.2–5.4)
SODIUM SPEC-SCNC: 134 MMOL/L (ref 136–145)
SODIUM SPEC-SCNC: 136 MMOL/L (ref 136–145)
SP GR UR: 1.02 (ref 1–1.03)
T4 (THYROXINE): 3.8 UG/DL (ref 5.53–11)
TSH SERPL-ACNC: 3.11 UIU/ML (ref 0.47–4.68)
UROBILINOGEN UR QL: 2 EU/DL
WBC # BLD AUTO: 10.2 K/MM3 (ref 4.8–10.8)
WBC # BLD AUTO: 8.6 K/MM3 (ref 4.8–10.8)
WBC # UR: (no result) #/HPF (ref 0–3)

## 2021-04-21 PROCEDURE — 80076 HEPATIC FUNCTION PANEL: CPT

## 2021-04-21 PROCEDURE — 96375 TX/PRO/DX INJ NEW DRUG ADDON: CPT

## 2021-04-21 PROCEDURE — 82140 ASSAY OF AMMONIA: CPT

## 2021-04-21 PROCEDURE — 83930 ASSAY OF BLOOD OSMOLALITY: CPT

## 2021-04-21 PROCEDURE — 99284 EMERGENCY DEPT VISIT MOD MDM: CPT

## 2021-04-21 PROCEDURE — 49083 ABD PARACENTESIS W/IMAGING: CPT

## 2021-04-21 PROCEDURE — 84436 ASSAY OF TOTAL THYROXINE: CPT

## 2021-04-21 PROCEDURE — 82565 ASSAY OF CREATININE: CPT

## 2021-04-21 PROCEDURE — 71046 X-RAY EXAM CHEST 2 VIEWS: CPT

## 2021-04-21 PROCEDURE — 87633 RESP VIRUS 12-25 TARGETS: CPT

## 2021-04-21 PROCEDURE — 81001 URINALYSIS AUTO W/SCOPE: CPT

## 2021-04-21 PROCEDURE — 87798 DETECT AGENT NOS DNA AMP: CPT

## 2021-04-21 PROCEDURE — 74181 MRI ABDOMEN W/O CONTRAST: CPT

## 2021-04-21 PROCEDURE — 82962 GLUCOSE BLOOD TEST: CPT

## 2021-04-21 PROCEDURE — 80048 BASIC METABOLIC PNL TOTAL CA: CPT

## 2021-04-21 PROCEDURE — 83935 ASSAY OF URINE OSMOLALITY: CPT

## 2021-04-21 PROCEDURE — 85610 PROTHROMBIN TIME: CPT

## 2021-04-21 PROCEDURE — 74176 CT ABD & PELVIS W/O CONTRAST: CPT

## 2021-04-21 PROCEDURE — P9047 ALBUMIN (HUMAN), 25%, 50ML: HCPCS

## 2021-04-21 PROCEDURE — 96367 TX/PROPH/DG ADDL SEQ IV INF: CPT

## 2021-04-21 PROCEDURE — 80053 COMPREHEN METABOLIC PANEL: CPT

## 2021-04-21 PROCEDURE — 87581 M.PNEUMON DNA AMP PROBE: CPT

## 2021-04-21 PROCEDURE — 76376 3D RENDER W/INTRP POSTPROCES: CPT

## 2021-04-21 PROCEDURE — 85730 THROMBOPLASTIN TIME PARTIAL: CPT

## 2021-04-21 PROCEDURE — 36415 COLL VENOUS BLD VENIPUNCTURE: CPT

## 2021-04-21 PROCEDURE — 84443 ASSAY THYROID STIM HORMONE: CPT

## 2021-04-21 PROCEDURE — 96365 THER/PROPH/DIAG IV INF INIT: CPT

## 2021-04-21 PROCEDURE — 85025 COMPLETE CBC W/AUTO DIFF WBC: CPT

## 2021-04-21 PROCEDURE — 84300 ASSAY OF URINE SODIUM: CPT

## 2021-04-21 NOTE — PC.NURSE
Lab advised the UA is not able to be performed at this time because the urine specimen has stool in it. RN caring for pt notified.

## 2021-04-22 VITALS
TEMPERATURE: 98.24 F | RESPIRATION RATE: 20 BRPM | SYSTOLIC BLOOD PRESSURE: 112 MMHG | OXYGEN SATURATION: 100 % | DIASTOLIC BLOOD PRESSURE: 67 MMHG | HEART RATE: 112 BPM

## 2021-04-22 VITALS — OXYGEN SATURATION: 98 % | HEART RATE: 101 BPM | RESPIRATION RATE: 18 BRPM

## 2021-04-22 VITALS
SYSTOLIC BLOOD PRESSURE: 122 MMHG | HEART RATE: 116 BPM | RESPIRATION RATE: 18 BRPM | OXYGEN SATURATION: 100 % | DIASTOLIC BLOOD PRESSURE: 68 MMHG | TEMPERATURE: 98.2 F

## 2021-04-22 VITALS
HEART RATE: 101 BPM | DIASTOLIC BLOOD PRESSURE: 54 MMHG | OXYGEN SATURATION: 98 % | SYSTOLIC BLOOD PRESSURE: 115 MMHG | TEMPERATURE: 98.06 F | RESPIRATION RATE: 18 BRPM

## 2021-04-22 VITALS
OXYGEN SATURATION: 100 % | RESPIRATION RATE: 15 BRPM | SYSTOLIC BLOOD PRESSURE: 92 MMHG | DIASTOLIC BLOOD PRESSURE: 59 MMHG | TEMPERATURE: 98.1 F | HEART RATE: 91 BPM

## 2021-04-22 LAB
ALBUMIN LEVEL: 2.5 G/DL (ref 3.5–5)
ALBUMIN/GLOB SERPL: 0.8 {RATIO} (ref 1.1–1.8)
ALP ISO SERPL-ACNC: 95 U/L (ref 38–126)
ALT SERPLBLD-CCNC: 17 U/L (ref 12–78)
AMMONIA: 89 UMOL/L (ref 9–30)
ANION GAP SERPL CALC-SCNC: 11 MEQ/L (ref 5–15)
AST SERPL QL: 27 U/L (ref 14–36)
BILIRUBIN,TOTAL: 1.8 MG/DL (ref 0.2–1.3)
BUN SERPL-MCNC: 45 MG/DL (ref 7–17)
CALCIUM SPEC-MCNC: 8.6 MG/DL (ref 8.4–10.2)
CHLORIDE SPEC-SCNC: 110 MMOL/L (ref 98–107)
CO2 SERPL-SCNC: 19 MMOL/L (ref 22–30)
CREAT BLD-SCNC: 2.7 MG/DL (ref 0.52–1.04)
CREATININE CLEARANCE ESTIMATED: 23 ML/MIN (ref 50–200)
ESTIMATED GLOMERULAR FILT RATE: 18 ML/MIN (ref 60–?)
GFR (AFRICAN AMERICAN): 22 ML/MIN (ref 60–?)
GLOBULIN SER CALC-MCNC: 3.2 G/DL (ref 1.3–3.2)
GLUCOSE BLDC GLUCOMTR-MCNC: 80 MG/DL (ref 70–110)
GLUCOSE: 84 MG/DL (ref 74–100)
HCT VFR BLD CALC: 26.5 % (ref 37–47)
HGB BLD-MCNC: 8.5 G/DL (ref 12.2–16.2)
MCHC RBC-ENTMCNC: 31.9 G/DL (ref 31.8–35.4)
MCV RBC: 97.1 FL (ref 81–99)
MEAN CORPUSCULAR HEMOGLOBIN: 30.9 PG (ref 27–31.2)
PLATELET # BLD: 55 K/MM3 (ref 142–424)
POTASSIUM: 4 MMOL/L (ref 3.5–5.1)
PROT SERPL-MCNC: 5.7 G/DL (ref 6.3–8.2)
RBC # BLD AUTO: 2.73 M/MM3 (ref 4.2–5.4)
SODIUM SPEC-SCNC: 136 MMOL/L (ref 136–145)
WBC # BLD AUTO: 6.7 K/MM3 (ref 4.8–10.8)

## 2021-04-22 NOTE — PC.NURSE
Pt has been pleasant and cooperative this shift. A&O X4. Pt has complained of pain in her abdomen and lower extremities X2 thus far and has received Oxycodone per MAR with favorable results. Pt is on room air with sats. >90%. Lungs CTA. 1+ pitting

## 2021-04-22 NOTE — PC.NURSE
Pt is sleeping at this time. C/O chronic back pain and abdominal discomfort. Pt requested home medication for pain, anxiety and Iron. MD notified. New orders received. Restart home medication Oxycodone 5 mg po Q6 prn. alprazolam 1 mg po BID and iron

## 2021-04-23 ENCOUNTER — INPATIENT HOSPITAL (OUTPATIENT)
Dept: RURAL HOSPITAL 5 | Facility: HOSPITAL | Age: 56
End: 2021-04-23
Payer: MEDICAID

## 2021-04-23 VITALS
DIASTOLIC BLOOD PRESSURE: 60 MMHG | SYSTOLIC BLOOD PRESSURE: 110 MMHG | TEMPERATURE: 98.2 F | HEART RATE: 110 BPM | OXYGEN SATURATION: 97 % | RESPIRATION RATE: 20 BRPM

## 2021-04-23 VITALS
DIASTOLIC BLOOD PRESSURE: 59 MMHG | HEART RATE: 99 BPM | SYSTOLIC BLOOD PRESSURE: 104 MMHG | OXYGEN SATURATION: 99 % | TEMPERATURE: 98.4 F | RESPIRATION RATE: 18 BRPM

## 2021-04-23 VITALS
TEMPERATURE: 97.88 F | HEART RATE: 90 BPM | DIASTOLIC BLOOD PRESSURE: 44 MMHG | RESPIRATION RATE: 17 BRPM | SYSTOLIC BLOOD PRESSURE: 102 MMHG | OXYGEN SATURATION: 100 %

## 2021-04-23 VITALS
HEART RATE: 109 BPM | RESPIRATION RATE: 18 BRPM | DIASTOLIC BLOOD PRESSURE: 61 MMHG | OXYGEN SATURATION: 98 % | SYSTOLIC BLOOD PRESSURE: 105 MMHG | TEMPERATURE: 98.96 F

## 2021-04-23 VITALS — HEART RATE: 109 BPM | OXYGEN SATURATION: 98 % | RESPIRATION RATE: 18 BRPM

## 2021-04-23 DIAGNOSIS — R18.8 OTHER ASCITES: ICD-10-CM

## 2021-04-23 DIAGNOSIS — K72.90 HEPATIC FAILURE, UNSPECIFIED WITHOUT COMA: ICD-10-CM

## 2021-04-23 DIAGNOSIS — K74.69 OTHER CIRRHOSIS OF LIVER: ICD-10-CM

## 2021-04-23 LAB
ALBUMIN LEVEL: 2.5 G/DL (ref 3.5–5)
ALBUMIN/GLOB SERPL: 0.7 {RATIO} (ref 1.1–1.8)
ALP ISO SERPL-ACNC: 114 U/L (ref 38–126)
ALT SERPLBLD-CCNC: 21 U/L (ref 12–78)
AMMONIA: 48 UMOL/L (ref 9–30)
ANION GAP SERPL CALC-SCNC: 13.8 MEQ/L (ref 5–15)
AST SERPL QL: 39 U/L (ref 14–36)
BILIRUBIN,TOTAL: 1.8 MG/DL (ref 0.2–1.3)
BUN SERPL-MCNC: 44 MG/DL (ref 7–17)
CALCIUM SPEC-MCNC: 8.8 MG/DL (ref 8.4–10.2)
CHLORIDE SPEC-SCNC: 111 MMOL/L (ref 98–107)
CO2 SERPL-SCNC: 18 MMOL/L (ref 22–30)
CREAT BLD-SCNC: 2.9 MG/DL (ref 0.52–1.04)
CREATININE CLEARANCE ESTIMATED: 21 ML/MIN (ref 50–200)
ESTIMATED GLOMERULAR FILT RATE: 17 ML/MIN (ref 60–?)
GFR (AFRICAN AMERICAN): 20 ML/MIN (ref 60–?)
GLOBULIN SER CALC-MCNC: 3.5 G/DL (ref 1.3–3.2)
GLUCOSE: 95 MG/DL (ref 74–100)
HCT VFR BLD CALC: 28 % (ref 37–47)
HGB BLD-MCNC: 8.7 G/DL (ref 12.2–16.2)
MCHC RBC-ENTMCNC: 31 G/DL (ref 31.8–35.4)
MCV RBC: 99.6 FL (ref 81–99)
MEAN CORPUSCULAR HEMOGLOBIN: 30.9 PG (ref 27–31.2)
PLATELET # BLD: 61 K/MM3 (ref 142–424)
POTASSIUM: 3.8 MMOL/L (ref 3.5–5.1)
PROT SERPL-MCNC: 6 G/DL (ref 6.3–8.2)
RBC # BLD AUTO: 2.81 M/MM3 (ref 4.2–5.4)
SODIUM SPEC-SCNC: 139 MMOL/L (ref 136–145)
SODIUM, URINE: <20 MMOL/L
WBC # BLD AUTO: 10.2 K/MM3 (ref 4.8–10.8)

## 2021-04-23 PROCEDURE — 99222 1ST HOSP IP/OBS MODERATE 55: CPT | Performed by: INTERNAL MEDICINE

## 2021-04-23 NOTE — DIET.NUTRFU
Pt with severe protein calorie malnutrition rt Cirrhosis with loss of 42% BW past 4mo. BID supplements on diet order, encouragement/cueing at meal times appreciated.

## 2021-04-23 NOTE — PC.NURSE
Pt requested that this RN call Dr. Galloway because she would like to go home.  Explained that Dr. Taveras was on call and she stated she would wait until the morning to speak with Dr. Galloway.  Will continue to monitor for any acute changes.

## 2021-04-23 NOTE — PC.NURSE
Pt has been pleasant and cooperative this shift. A&O X4. Pt has complained of pain in her lower extremities X1 thus far today and has received Oxycodone per MAR with favorable results. Pt is on room air with sats. >90%. Lungs CTA. 1+ pitting edema

## 2021-04-23 NOTE — PC.NURSE
Pt A&O x4. Pt has c/o abd discomfort but has not requested any pain meds. Lungs CTA, on room air. Pt able to ambulate to the bathroom independently. Pt has had several liquid bowel movements this shift. Abd is distended and tender but soft. Pt has

## 2021-04-24 VITALS
SYSTOLIC BLOOD PRESSURE: 92 MMHG | RESPIRATION RATE: 17 BRPM | OXYGEN SATURATION: 93 % | HEART RATE: 86 BPM | DIASTOLIC BLOOD PRESSURE: 64 MMHG | TEMPERATURE: 98.1 F

## 2021-04-24 VITALS
HEART RATE: 82 BPM | RESPIRATION RATE: 20 BRPM | TEMPERATURE: 97.8 F | OXYGEN SATURATION: 100 % | SYSTOLIC BLOOD PRESSURE: 92 MMHG | DIASTOLIC BLOOD PRESSURE: 47 MMHG

## 2021-04-24 VITALS — OXYGEN SATURATION: 100 % | RESPIRATION RATE: 20 BRPM | HEART RATE: 82 BPM

## 2021-04-24 VITALS — RESPIRATION RATE: 18 BRPM

## 2021-04-24 LAB
ALBUMIN LEVEL: 2.4 G/DL (ref 3.5–5)
ALBUMIN/GLOB SERPL: 0.7 {RATIO} (ref 1.1–1.8)
ALP ISO SERPL-ACNC: 108 U/L (ref 38–126)
ALT SERPLBLD-CCNC: 25 U/L (ref 12–78)
AMMONIA: 56 UMOL/L (ref 9–30)
ANION GAP SERPL CALC-SCNC: 13.6 MEQ/L (ref 5–15)
AST SERPL QL: 35 U/L (ref 14–36)
BILIRUBIN,TOTAL: 1.5 MG/DL (ref 0.2–1.3)
BUN SERPL-MCNC: 45 MG/DL (ref 7–17)
CALCIUM SPEC-MCNC: 8.5 MG/DL (ref 8.4–10.2)
CHLORIDE SPEC-SCNC: 110 MMOL/L (ref 98–107)
CO2 SERPL-SCNC: 16 MMOL/L (ref 22–30)
CREAT BLD-SCNC: 3.1 MG/DL (ref 0.52–1.04)
CREATININE CLEARANCE ESTIMATED: 20 ML/MIN (ref 50–200)
ESTIMATED GLOMERULAR FILT RATE: 16 ML/MIN (ref 60–?)
GFR (AFRICAN AMERICAN): 19 ML/MIN (ref 60–?)
GLOBULIN SER CALC-MCNC: 3.5 G/DL (ref 1.3–3.2)
GLUCOSE: 99 MG/DL (ref 74–100)
HCT VFR BLD CALC: 27.1 % (ref 37–47)
HGB BLD-MCNC: 8.5 G/DL (ref 12.2–16.2)
INR PPP: 1.85 (ref 0.9–1.1)
MCHC RBC-ENTMCNC: 31.2 G/DL (ref 31.8–35.4)
MCV RBC: 98.6 FL (ref 81–99)
MEAN CORPUSCULAR HEMOGLOBIN: 30.8 PG (ref 27–31.2)
OSMOLALITY, URINE: 455 MOSMOL/KG
PLATELET # BLD: 64 K/MM3 (ref 142–424)
POTASSIUM: 3.6 MMOL/L (ref 3.5–5.1)
PROT SERPL-MCNC: 5.9 G/DL (ref 6.3–8.2)
PT BLD: 20.9 SECONDS (ref 10.1–12.5)
RBC # BLD AUTO: 2.75 M/MM3 (ref 4.2–5.4)
SODIUM SPEC-SCNC: 136 MMOL/L (ref 136–145)
WBC # BLD AUTO: 8.4 K/MM3 (ref 4.8–10.8)

## 2021-04-26 ENCOUNTER — HOSPITAL ENCOUNTER (OUTPATIENT)
Dept: HOSPITAL 22 - LAB | Age: 56
Discharge: HOME | End: 2021-04-26
Payer: MEDICAID

## 2021-04-26 VITALS — BODY MASS INDEX: 25.7 KG/M2

## 2021-04-26 DIAGNOSIS — R18.8: Primary | ICD-10-CM

## 2021-04-26 DIAGNOSIS — Z98.890: ICD-10-CM

## 2021-04-26 LAB
ALBUMIN LEVEL: 2.8 G/DL (ref 3.5–5)
ALBUMIN/GLOB SERPL: 0.7 {RATIO} (ref 1.1–1.8)
ALP ISO SERPL-ACNC: 131 U/L (ref 38–126)
ALT SERPLBLD-CCNC: 31 U/L (ref 12–78)
ANION GAP SERPL CALC-SCNC: 13.7 MEQ/L (ref 5–15)
AST SERPL QL: 45 U/L (ref 14–36)
BILIRUBIN,TOTAL: 1.5 MG/DL (ref 0.2–1.3)
BUN SERPL-MCNC: 49 MG/DL (ref 7–17)
CALCIUM SPEC-MCNC: 8.9 MG/DL (ref 8.4–10.2)
CHLORIDE SPEC-SCNC: 108 MMOL/L (ref 98–107)
CO2 SERPL-SCNC: 16 MMOL/L (ref 22–30)
CREAT BLD-SCNC: 3.6 MG/DL (ref 0.52–1.04)
CREATININE CLEARANCE ESTIMATED: 19 ML/MIN (ref 50–200)
ESTIMATED GLOMERULAR FILT RATE: 13 ML/MIN (ref 60–?)
GFR (AFRICAN AMERICAN): 16 ML/MIN (ref 60–?)
GLOBULIN SER CALC-MCNC: 3.9 G/DL (ref 1.3–3.2)
GLUCOSE: 96 MG/DL (ref 74–100)
POTASSIUM: 3.7 MMOL/L (ref 3.5–5.1)
PROT SERPL-MCNC: 6.7 G/DL (ref 6.3–8.2)
SODIUM SPEC-SCNC: 134 MMOL/L (ref 136–145)

## 2021-04-26 PROCEDURE — 80053 COMPREHEN METABOLIC PANEL: CPT

## 2021-04-27 ENCOUNTER — HOSPITAL ENCOUNTER (OUTPATIENT)
Dept: HOSPITAL 22 - INF | Age: 56
Discharge: HOME | End: 2021-04-27
Payer: MEDICAID

## 2021-04-27 VITALS
SYSTOLIC BLOOD PRESSURE: 91 MMHG | TEMPERATURE: 97.16 F | OXYGEN SATURATION: 98 % | RESPIRATION RATE: 18 BRPM | DIASTOLIC BLOOD PRESSURE: 54 MMHG | HEART RATE: 81 BPM

## 2021-04-27 VITALS — SYSTOLIC BLOOD PRESSURE: 101 MMHG | RESPIRATION RATE: 18 BRPM | HEART RATE: 83 BPM | DIASTOLIC BLOOD PRESSURE: 54 MMHG

## 2021-04-27 VITALS — DIASTOLIC BLOOD PRESSURE: 58 MMHG | SYSTOLIC BLOOD PRESSURE: 96 MMHG | RESPIRATION RATE: 18 BRPM | HEART RATE: 81 BPM

## 2021-04-27 VITALS — RESPIRATION RATE: 18 BRPM | HEART RATE: 80 BPM | SYSTOLIC BLOOD PRESSURE: 117 MMHG | DIASTOLIC BLOOD PRESSURE: 92 MMHG

## 2021-04-27 DIAGNOSIS — N17.9: Primary | ICD-10-CM

## 2021-04-27 PROCEDURE — 96360 HYDRATION IV INFUSION INIT: CPT

## 2021-05-03 ENCOUNTER — HOSPITAL ENCOUNTER (OUTPATIENT)
Dept: HOSPITAL 22 - INF | Age: 56
Discharge: HOME | End: 2021-05-03
Payer: MEDICAID

## 2021-05-03 DIAGNOSIS — K74.60: Primary | ICD-10-CM

## 2021-05-03 LAB
ALBUMIN LEVEL: 2.6 G/DL (ref 3.5–5)
ALBUMIN/GLOB SERPL: 0.7 {RATIO} (ref 1.1–1.8)
ALP ISO SERPL-ACNC: 131 U/L (ref 38–126)
ALT SERPLBLD-CCNC: 33 U/L (ref 12–78)
AMMONIA: 58 UMOL/L (ref 9–30)
ANION GAP SERPL CALC-SCNC: 11.7 MEQ/L (ref 5–15)
AST SERPL QL: 45 U/L (ref 14–36)
BILIRUBIN,TOTAL: 1.7 MG/DL (ref 0.2–1.3)
BUN SERPL-MCNC: 51 MG/DL (ref 7–17)
CALCIUM SPEC-MCNC: 8.6 MG/DL (ref 8.4–10.2)
CHLORIDE SPEC-SCNC: 105 MMOL/L (ref 98–107)
CO2 SERPL-SCNC: 17 MMOL/L (ref 22–30)
CREAT BLD-SCNC: 3.4 MG/DL (ref 0.52–1.04)
ESTIMATED GLOMERULAR FILT RATE: 14 ML/MIN (ref 60–?)
GFR (AFRICAN AMERICAN): 17 ML/MIN (ref 60–?)
GLOBULIN SER CALC-MCNC: 4 G/DL (ref 1.3–3.2)
GLUCOSE: 109 MG/DL (ref 74–100)
HCT VFR BLD CALC: 28.2 % (ref 37–47)
HGB BLD-MCNC: 9.3 G/DL (ref 12.2–16.2)
MCHC RBC-ENTMCNC: 33 G/DL (ref 31.8–35.4)
MCV RBC: 95.9 FL (ref 81–99)
MEAN CORPUSCULAR HEMOGLOBIN: 31.6 PG (ref 27–31.2)
PLATELET # BLD: 86 K/MM3 (ref 142–424)
POTASSIUM: 3.7 MMOL/L (ref 3.5–5.1)
PROT SERPL-MCNC: 6.6 G/DL (ref 6.3–8.2)
RBC # BLD AUTO: 2.94 M/MM3 (ref 4.2–5.4)
SODIUM SPEC-SCNC: 130 MMOL/L (ref 136–145)
WBC # BLD AUTO: 5 K/MM3 (ref 4.8–10.8)

## 2021-05-03 PROCEDURE — 84134 ASSAY OF PREALBUMIN: CPT

## 2021-05-03 PROCEDURE — 80053 COMPREHEN METABOLIC PANEL: CPT

## 2021-05-03 PROCEDURE — 85025 COMPLETE CBC W/AUTO DIFF WBC: CPT

## 2021-05-03 PROCEDURE — 82140 ASSAY OF AMMONIA: CPT

## 2021-05-06 LAB — PREALBUMIN: 4 MG/DL (ref 10–36)
